# Patient Record
Sex: FEMALE | Race: WHITE
[De-identification: names, ages, dates, MRNs, and addresses within clinical notes are randomized per-mention and may not be internally consistent; named-entity substitution may affect disease eponyms.]

---

## 2018-12-27 ENCOUNTER — HOSPITAL ENCOUNTER (EMERGENCY)
Dept: HOSPITAL 10 - E/R | Age: 38
Discharge: HOME | End: 2018-12-27
Payer: COMMERCIAL

## 2018-12-27 ENCOUNTER — HOSPITAL ENCOUNTER (EMERGENCY)
Dept: HOSPITAL 91 - E/R | Age: 38
Discharge: HOME | End: 2018-12-27
Payer: COMMERCIAL

## 2018-12-27 VITALS — DIASTOLIC BLOOD PRESSURE: 71 MMHG | HEART RATE: 96 BPM | RESPIRATION RATE: 20 BRPM | SYSTOLIC BLOOD PRESSURE: 118 MMHG

## 2018-12-27 VITALS
BODY MASS INDEX: 19.66 KG/M2 | HEIGHT: 67 IN | WEIGHT: 125.27 LBS | BODY MASS INDEX: 19.66 KG/M2 | HEIGHT: 67 IN | WEIGHT: 125.27 LBS

## 2018-12-27 DIAGNOSIS — E87.2: Primary | ICD-10-CM

## 2018-12-27 LAB
ADD MAN DIFF?: NO
ADD UMIC: NO
ALLEN TEST: (no result)
ANION GAP: 12 (ref 5–13)
ARTERIAL BASE EXCESS: -3.8 MMOL/L (ref -3–3)
ARTERIAL BLOOD GAS OXYGEN SAT: 99.2 MMHG (ref 95–98)
ARTERIAL COHB: 0.3 % (ref 0–3)
ARTERIAL FRACTION OF OXYHGB: 98.7 % (ref 93–99)
ARTERIAL HCO3: 19.5 MMOL/L (ref 22–26)
ARTERIAL METHB: 0.2 % (ref 0–1.5)
ARTERIAL PCO2: 30.3 MMHG (ref 35–45)
BASOPHIL #: 0.1 10^3/UL (ref 0–0.1)
BASOPHILS %: 0.5 % (ref 0–2)
BLOOD UREA NITROGEN: 14 MG/DL (ref 7–20)
CALCIUM: 8.8 MG/DL (ref 8.4–10.2)
CARBON DIOXIDE: 22 MMOL/L (ref 21–31)
CHLORIDE: 108 MMOL/L (ref 97–110)
CREATININE: 0.6 MG/DL (ref 0.44–1)
EOSINOPHILS #: 0.8 10^3/UL (ref 0–0.5)
EOSINOPHILS %: 6.3 % (ref 0–7)
FIO2: 39 %
GLUCOSE: 159 MG/DL (ref 70–220)
HEMATOCRIT: 39.5 % (ref 37–47)
HEMOGLOBIN: 13.3 G/DL (ref 12–16)
IMMATURE GRANS #M: 0.03 10^3/UL (ref 0–0.03)
IMMATURE GRANS % (M): 0.2 % (ref 0–0.43)
LACTIC ACID: 4.4 MMOL/L (ref 0.5–2)
LACTIC ACID: 5 MMOL/L (ref 0.5–2)
LYMPHOCYTES #: 1.3 10^3/UL (ref 0.8–2.9)
LYMPHOCYTES %: 10.5 % (ref 15–51)
MEAN CORPUSCULAR HEMOGLOBIN: 30.2 PG (ref 29–33)
MEAN CORPUSCULAR HGB CONC: 33.7 G/DL (ref 32–37)
MEAN CORPUSCULAR VOLUME: 89.8 FL (ref 82–101)
MEAN PLATELET VOLUME: 10.8 FL (ref 7.4–10.4)
MODE: (no result)
MONOCYTE #: 0.5 10^3/UL (ref 0.3–0.9)
MONOCYTES %: 4 % (ref 0–11)
NEUTROPHIL #: 9.6 10^3/UL (ref 1.6–7.5)
NEUTROPHILS %: 78.5 % (ref 39–77)
NUCLEATED RED BLOOD CELLS #: 0 10^3/UL (ref 0–0)
NUCLEATED RED BLOOD CELLS%: 0 /100WBC (ref 0–0)
O2 A-A PPRESDIFF RESPIRATORY: 35 MMHG (ref 7–24)
PLATELET COUNT: 224 10^3/UL (ref 140–415)
POTASSIUM: 3 MMOL/L (ref 3.5–5.1)
RED BLOOD COUNT: 4.4 10^6/UL (ref 4.2–5.4)
RED CELL DISTRIBUTION WIDTH: 12.7 % (ref 11.5–14.5)
SODIUM: 142 MMOL/L (ref 135–144)
UR ASCORBIC ACID: NEGATIVE MG/DL
UR BILIRUBIN (DIP): NEGATIVE MG/DL
UR BLOOD (DIP): NEGATIVE MG/DL
UR CLARITY: CLEAR
UR COLOR: COLORLESS
UR GLUCOSE (DIP): (no result) MG/DL
UR KETONES (DIP): NEGATIVE MG/DL
UR LEUKOCYTE ESTERASE (DIP): NEGATIVE LEU/UL
UR NITRITE (DIP): NEGATIVE MG/DL
UR PH (DIP): 5 (ref 5–9)
UR SPECIFIC GRAVITY (DIP): 1 (ref 1–1.03)
UR TOTAL PROTEIN (DIP): NEGATIVE MG/DL
UR UROBILINOGEN (DIP): NEGATIVE MG/DL
WHITE BLOOD COUNT: 12.3 10^3/UL (ref 4.8–10.8)

## 2018-12-27 PROCEDURE — 36600 WITHDRAWAL OF ARTERIAL BLOOD: CPT

## 2018-12-27 PROCEDURE — 94644 CONT INHLJ TX 1ST HOUR: CPT

## 2018-12-27 PROCEDURE — 85025 COMPLETE CBC W/AUTO DIFF WBC: CPT

## 2018-12-27 PROCEDURE — 81003 URINALYSIS AUTO W/O SCOPE: CPT

## 2018-12-27 PROCEDURE — 82803 BLOOD GASES ANY COMBINATION: CPT

## 2018-12-27 PROCEDURE — 96374 THER/PROPH/DIAG INJ IV PUSH: CPT

## 2018-12-27 PROCEDURE — 80048 BASIC METABOLIC PNL TOTAL CA: CPT

## 2018-12-27 PROCEDURE — 99291 CRITICAL CARE FIRST HOUR: CPT

## 2018-12-27 PROCEDURE — 83605 ASSAY OF LACTIC ACID: CPT

## 2018-12-27 PROCEDURE — 93005 ELECTROCARDIOGRAM TRACING: CPT

## 2018-12-27 PROCEDURE — 71045 X-RAY EXAM CHEST 1 VIEW: CPT

## 2018-12-27 PROCEDURE — 87400 INFLUENZA A/B EACH AG IA: CPT

## 2018-12-27 PROCEDURE — 94645 CONT INHLJ TX EACH ADDL HOUR: CPT

## 2018-12-27 RX ADMIN — LEVALBUTEROL 1 MG: 1.25 SOLUTION, CONCENTRATE RESPIRATORY (INHALATION) at 05:58

## 2018-12-27 RX ADMIN — ALBUTEROL SULFATE 1 MG: 2.5 SOLUTION RESPIRATORY (INHALATION) at 07:48

## 2018-12-27 RX ADMIN — POTASSIUM CHLORIDE 1 MEQ: 1.5 POWDER, FOR SOLUTION ORAL at 07:39

## 2018-12-27 RX ADMIN — METHYLPREDNISOLONE SODIUM SUCCINATE 1 MG: 125 INJECTION, POWDER, FOR SOLUTION INTRAMUSCULAR; INTRAVENOUS at 06:19

## 2018-12-27 RX ADMIN — THIAMINE HYDROCHLORIDE 1 ML: 100 INJECTION, SOLUTION INTRAMUSCULAR; INTRAVENOUS at 09:33

## 2018-12-27 RX ADMIN — IPRATROPIUM BROMIDE 1 MG: 0.5 SOLUTION RESPIRATORY (INHALATION) at 05:57

## 2018-12-27 RX ADMIN — THIAMINE HYDROCHLORIDE 1 ML: 100 INJECTION, SOLUTION INTRAMUSCULAR; INTRAVENOUS at 06:19

## 2018-12-27 NOTE — CONS
Date/Time of Note


Date/Time of Note


DATE: 12/27/18 


TIME: 15:03





Assessment/Plan


Assessment/Plan


Hospital Course





In the emergency room she was tachy to 110s, saturating 95% on room air. She was


given atrovent/xopinex at 6:00 then albuterol at 8:00. Also solumedrol IV. 


Chest XR was clear with no infiltrates. Flu swab negative. UA negative for 


infection.


She was also hypokalemic to 3.0 and got PO replacement. 


Of note, her lactate was drawn on admission and uptrended to 5.0 at time of 


discharge. 


When I saw the patient around 14:00 she was walking, breathing comfortably on 


room air, and politely requested to go home.


Assessment/Plan








37 yo woman with acute asthma exacerbation.





#Asthma exacerbation


- s/p SHAAN and antimuscarininc.


- IV solumdrol in ED, will prescribe 5 day course of prednisone 40 mg daily.


- Gave patient return precautions for ED.





#Lactic acidosis


- Lactic acid >2 on admission and uptrended to 5 in the ED. 


- There is no evidence of sepsis from a bacterial infection: CXR clear of 


infiltrates, she has no dysuria, no fevers, she does have throat pain but CENTOR


criteria is 0 meaning no workup for strep throat is indicated. 


- In the absence of sepsis, the prognostic value of lactate is unclear. 


- Given her reassuring history and exam, I believe this patient is appropriate 


for discharge with strict return instructions if her condition worsens.


Result Diagram:  


12/27/18 0610                                                                   


            12/27/18 0610








Consultation Date/Type/Reason


Admit Date/Time


Dec 27, 2018


Date of Consultation:  Dec 27, 2018


Type of Consult


Internal Medicine


Reason for Consultation


Acute asthma exacerbation


Requesting Provider:  ALFREDITO GUIDO MD





Hx of Present Illness


Ms. Keller is a 37 yo woman with asthma who presents with acute exacerbation.





She was in her usual state of health until yesterday; when she developed dry 


cough and chills. Her children have been sick with what sounds like an upper 


respiratory infection. She also had nausea and anorexia and sore throat. Last 


night she woke up coughing with shortness of breath, she used her inhaler 6 t


imes but with no relief of dyspnea. So she presented to ED. 





She was diagnosed with asthma about 4-6 years ago and is on albuterol inhaler 


prn. She takes it very rarely. Last exacerbation was in January this year, she 


presented to the emergency room but was not admitted. Has never required 


intubation.


Denies fever, headache, dizziness, vision changes, productive cough, chest 


pain/pressure/palpitations, abdominal pain, vomiting, diarrhea, constipation, 


dysuria, hematuria, urinary frequency.





Past Medical History


Asthma x4-6 years on prn albuterol


Medications





Current Medications


Ondansetron HCl (Zofran Inj) 4 mg BRIDGE ORDER PRN IV NAUSEA AND/OR VOMITING;  


Start 12/27/18 at 14:00;  Stop 12/28/18 at 13:59


Acetaminophen (Tylenol Tab) 650 mg ER BRIDGE PRN PO MILD PAIN(1-3)OR ELEVATED 


TEMP;  Start 12/27/18 at 14:00;  Stop 12/28/18 at 13:59


Allergies:  


Coded Allergies:  


     No Known Allergy (Unverified , 12/27/18)





Past Surgical History


C section x1





Family History


Significant Family History:  no pertinent family hx





Social History


Alcohol Use:  occasionally


Smoking Status:  Never smoker


Drug Use:  none





Exam/Review of Systems


Vital Signs


Vitals





Vital Signs


  Date      Temp  Pulse  Resp  B/P (MAP)   Pulse Ox  O2          O2 Flow    FiO2


Time                                                 Delivery    Rate


  12/27/18           96    20      118/71        97  Room Air


     14:51                           (87)


  12/27/18  98.6


     09:21


  12/27/18                                                                    21


     07:48








Exam


Gen: Well appearing woman breathing comfortably on room air.


Eyes: PERRL, no icterus


HEENT: Clear oropharynx, no pharyngeal erythema, exudates; no tonsillar 


swelling.


Neck: Tender anteriorly. No palpable lymphadenopathy


Card: Tachy, regular rhythm, no murmurs


Pulm: Clear to auscultation bilaterally.


Abd: Soft, nontender, nondistended.


Ext: No cyanosis/clubbing/edema. 


Skin: warm, dry, well perfused.





Medications


Medications





Current Medications


Ondansetron HCl (Zofran Inj) 4 mg BRIDGE ORDER PRN IV NAUSEA AND/OR VOMITING;  


Start 12/27/18 at 14:00;  Stop 12/28/18 at 13:59


Acetaminophen (Tylenol Tab) 650 mg ER BRIDGE PRN PO MILD PAIN(1-3)OR ELEVATED 


TEMP;  Start 12/27/18 at 14:00;  Stop 12/28/18 at 13:59











AUDI COX MD              Dec 27, 2018 15:13

## 2018-12-27 NOTE — PDOCDIS
Discharge Instructions


DIAGNOSIS


Discharge Diagnosis


Upper respiratory infection.


Acute asthma exacerbation





CONDITION


                 [id number]
Patient Condition:  [id number]
Good








HOME CARE INSTRUCTIONS:


                [id number]
Diet Instructions:  [id number]
Regular








ACTIVITY:


          [id number]
Activity Restrictions:  [id number]
No Restrictions








FOLLOW UP/APPOINTMENTS


Follow-up Plan


1. Take all medications as prescribed. 


2. Finish a five day course of oral prednisone to reduce the risk of further 


exacerbation.


3. Continue to take albuterol inhaler as needed for shortness of breath.


4. See your primary care doctor in 1 week. 


5. For worsening shortness of breath or cough that does not improve after 


inhaler, return to the emergency room.











AUDI COX MD              Dec 27, 2018 15:03

## 2018-12-27 NOTE — ERD
ER Documentation


Chief Complaint


Chief Complaint





BIB [facility] for SOB





HPI


38-year-old female with a history of asthma, occasional ED visits but no 


admissions or intubations presents to the ED by rescue ambulance complaining of 


a 1 day history of nonspecific URI symptoms with rhinorrhea, productive cough, 


body aches, chest tightness, shortness of breath and wheezing.  No palpitations.


 No abdominal pain, nausea, vomiting, diarrhea or constipation.  Denies leg pain


or swelling.  No headache, neck or back pain.  No dysuria or polyuria.  


Subjective fevers but no chills.





ROS


All systems reviewed and are negative except as per history of present illness.





Medications


Home Meds


Active Scripts


Prednisone* (Prednisone*) 20 Mg Tab, 40 MG PO DAILY for 5 Days, #5 TAB


   Prov:AUDI COX MD         18





Allergies


Allergies:  


Coded Allergies:  


     No Known Allergy (Unverified , 18)





PMhx/Soc


Reviewed in chart. As per HPI.


History of Surgery:  No


Anesthesia Reaction:  No


Hx Neurological Disorder:  No


Hx Respiratory Disorders:  Yes (Asthma)


Hx Cardiac Disorders:  No


Hx Psychiatric Problems:  No


Hx Miscellaneous Medical Probl:  No


Hx Alcohol Use:  Yes (once in a while)


Hx Substance Use:  No


Hx Tobacco Use:  No


Smoking Status:  Never smoker





FmHx


No sudden cardiac death or cancer





Physical Exam


Vitals


Temp: 98.3.  Blood pressure: 125/90.  Pulse: 107.  Respirations: 25.  O2 


saturation 100% on high flow oxygen.


Physical Exam


Const:   No acute distress


Head:   Atraumatic 


Eyes:    Normal Conjunctiva


ENT:    Normal External Ears, Nose and Mouth.


Neck:               Full range of motion. No meningismus.


Resp:   Clear to auscultation bilaterally


Cardio:   Regular rate and rhythm, no murmurs


Abd:    Soft, non tender, non distended. Normal bowel sounds


Skin:   No petechiae or rashes


Back:   No midline or flank tenderness


Ext:    No cyanosis, or edema


Neur:   Awake and alert


Psych:    Normal Mood and Affect


Results 24 hrs





Laboratory Tests


Test
            18
05:50   18
05:51  18
06:10  18
09:11


POC Venous          2.7 mmol/L                                       3.8 mmol/L


Lactate


Blood Gas                        Blood arterial


Specimen Source


Arterial Blood   
               2018
6:22  
               



Date Drawn
                               :16 AM


Arterial Blood   
                       7.426 
  
               



pH


(Temp
corrected


)


Arterial Blood   
                   30.3 mmhg 
  
               



pCO2


(Temp
correct)


Arterial Blood   
                  208.2 mmHG 
  
               



pO2


(Temp
corrected


)


Arterial Blood                      19.5 mmol/L


HCO3


Arterial Blood                      -3.8 mmol/L


Base Excess


Arterial Blood   
                   99.2 mmHG 
  
               



Oxygen
Saturati


on


Pawel Test                       ACCEPTAB


Arterial Blood   
               Right Radial 
   
               



Gas


Puncture
Site


Arterial         
                       0.3 % 
  
               



Blood
Carboxyhe


moglobin


Arterial Blood                            0.2 %


Methemoglobin


Blood Gas A-a                         35.0 mmHg


O2 Differential


Oxyhemoglobin                            98.7 %


Percent


Blood Gas                                37.0 C


Temperature


Blood Gas                        NASAL CANNULA


Modality


FiO2                                     39.0 %


Blood Gas                        


Notified Whom


Blood Gas        
               2018
6:30  
               



Notified Time
                            :53 AM


White Blood                                        12.3 10^3/ul


Count


Red Blood Count                                    4.40 10^6/ul


Hemoglobin                                            13.3 g/dl


Hematocrit                                               39.5 %


Mean                                                    89.8 fl


Corpuscular


Volume


Mean                                                    30.2 pg


Corpuscular


Hemoglobin


Mean             
               
                   33.7 g/dl 
  



Corpuscular


Hemoglobin
Conc


ent


Red Cell                                                 12.7 %


Distribution


Width


Platelet Count                                      224 10^3/UL


Mean Platelet                                           10.8 fl


Volume


Immature                                                0.200 %


Granulocytes %


Neutrophils %                                            78.5 %


Lymphocytes %                                            10.5 %


Monocytes %                                               4.0 %


Eosinophils %                                             6.3 %


Basophils %                                               0.5 %


Nucleated Red                                       0.0 /100WBC


Blood Cells %


Immature                                          0.030 10^3/ul


Granulocytes #


Neutrophils #                                       9.6 10^3/ul


Lymphocytes #                                       1.3 10^3/ul


Monocytes #                                         0.5 10^3/ul


Eosinophils #                                       0.8 10^3/ul


Basophils #                                         0.1 10^3/ul


Nucleated Red                                       0.0 10^3/ul


Blood Cells #


Sodium Level                                         142 mmol/L


Potassium Level                                      3.0 mmol/L


Chloride Level                                       108 mmol/L


Carbon Dioxide                                        22 mmol/L


Level


Anion Gap                                                    12


Blood Urea                                             14 mg/dl


Nitrogen


Creatinine                                           0.60 mg/dl


Est Glomerular   
               
                > 60 mL/min 
   



Filtrat


Rate
mL/min


Glucose Level                                         159 mg/dl


Calcium Level                                         8.8 mg/dl


Test
            18
09:38   18
10:30  18
11:10  



Lactic Acid         4.4 mmol/L                       5.0 mmol/L


Level


Urine Color                      COLORLESS


Urine Clarity                    CLEAR


Urine pH                                    5.0


Urine Specific                            1.002


Gravity


Urine Ketones                    NEGATIVE mg/dL


Urine Nitrite                    NEGATIVE mg/dL


Urine Bilirubin                  NEGATIVE mg/dL


Urine                            NEGATIVE mg/dL


Urobilinogen


Urine Leukocyte  
               NEGATIVE
Polina/ul  
               



Esterase



Urine                            NEGATIVE mg/dL


Hemoglobin


Urine Glucose                          1+ mg/dL


Urine Total                      NEGATIVE mg/dl


Protein





Current Medications


 Medications
   Dose
          Sig/Ruth
       Start Time
   Status  Last


 (Trade)       Ordered        Route
 PRN     Stop Time              Admin
Dose


                              Reason                                Admin


                5 mg           ONCE  STAT
    18      DC          18


Levalbuterol
                 INH
           05:51
                       05:58



 (Xopenex                                    18


Neb)                                         05:52


 Ipratropium
   1 mg           ONCE  STAT
    18      DC          18


Bromide
                      INH
           05:51
                       05:57



(Atrovent                                    18


0.02%
                                       05:52


(Neb))


                125 mg         ONCE  STAT
    18      DC          18


Methylprednis                 IV
            06:14
                       06:19



olone
 Sodium                                18


Succinate
                                   06:16


(Solu-Medrol)


 Albuterol
     15 mg          ONCE  STAT
    18      DC          18


(Proventil                    INH
           06:14
                       07:48



0.5%
  (Neb))                                18


                                             06:16


 Sodium         1,700 ml       BOLUS OVER 2   18      DC          18


Chloride
                     HOURS STAT
    06:14
                       06:19



(NS)                          IV*
           18


                                             06:16


 Potassium      40 meq         ONCE  ONCE
    18      DC          18


Chloride
                     PO
            07:30
                       07:39



(Potassium
                                  18


Chloride                                     07:50


Pwd/Soln)


 Sodium         1,700 ml       BOLUS OVER 2   18      DC          18


Chloride
                     HOURS STAT
    09:18
                       09:33



(NS)                          IV*
           18


                                             09:20


 Ondansetron    4 mg           BRIDGE ORDER   18      DC       



HCl
  (Zofran                 PRN
 IV
       14:00



Inj)                          NAUSEA AND/OR  18


                              VOMITING       15:46


                650 mg         ER BRIDGE      18      DC       



Acetaminophen                 PRN
 PO
 MILD  14:00




  (Tylenol                   PAIN(1-3)OR    18


Tab)                          ELEVATED TEMP  15:46








Procedures/MDM


DOCUMENTS REVIEWED:   ED nurse, EMS report, no prior records





EKG:  Time:  52.  Sinus tachycardia.  Ventricular rate 106.  Normal [place] and 


QRS.  No ST segment elevation or depression.  No ectopy.   My Interpretation








IMAGING: Chest AP portable.  Cardiac silhouette is normal.  The costophrenic 


angles are clear.  No mediastinal widening.  No effusions or infiltrates.  My 


interpretation.








REEXAMINATION/REEVALUATION: 


Time: 06:30.  Patient reports ongoing shortness of breath.  Increasing breath 


sounds with mild residual expiratory wheezing.





CRITICAL CARE TIME: Due to the high probability of sudden clinically significant


respiratory and hemodynamic deterioration, this patient with shortness of breath


during acute asthma exacerbation required multiple, frequent reevaluations of 


vital signs and response to therapy. Additional critical care time was spent in 


obtaining supplemental history from EMS, interpretation of relevant clinical 


data including labs, x-ray and EKG. 


TOTAL CRITICAL CARE TIME: 35 minutes not including other separately reportable 


procedures.








MEDICAL DECISION MAKIN-year-old female with a history of asthma, 


occasional ED visits but no admissions or intubations presents to the ED by 


rescue ambulance complaining of a 1 day history of nonspecific URI symptoms with


rhinorrhea, productive cough, body aches, chest tightness, shortness of breath 


and wheezing.  CBC significant for mild leukocytosis but no anemia or 


thrombocytopenia.  Chemistry reveals hypokalemia but no renal insufficiency or 


hyperglycemia.  EKG shows sinus tachycardia but no ischemic changes or ectopy.  


Chest x-ray without infiltrate, effusion or signs of pneumonia or congestive 


heart failure.  Patient presents with acute asthma exacerbation likely secondary


to nonspecific viral URI, resolved with nebulized beta agonists and intravenous 


corticosteroids.  Chest tightness which is typical of her asthma exacerbation.  


No radiographic evidence of pneumonia or pneumothorax.  Low risk for pulmonary 


embolism.  Acute coronary syndrome is unlikely.  Heart score is 0.  Patient 


observed in the ED for over 5 hours.  Increasing lactic acidosis despite normal 


saline 30 cc fluid bolus x2, likely secondary to acute asthma exacerbation.  


Systemic inflammatory response syndrome but no source of infection hence an


tibiotics are not administered.  Influenza is negative.  Considering the 


patient's rising lactic acid admission for further evaluation and management is 


advised.  Care was transferred to Dr. Cox at 14:45.





Departure


Diagnosis:  


   Primary Impression:  


   Shortness of breath


   Additional Impressions:  


   Acute asthma exacerbation


   Asthma severity:  moderate  Asthma persistence:  unspecified  Qualified 


   Codes:  J45.901 - Unspecified asthma with (acute) exacerbation


   Lactic acidosis


   Nonspecific syndrome suggestive of viral illness


Condition:  Serious











ALFREDITO GUIDO MD           Dec 27, 2018 06:08